# Patient Record
Sex: MALE | Race: WHITE | NOT HISPANIC OR LATINO
[De-identification: names, ages, dates, MRNs, and addresses within clinical notes are randomized per-mention and may not be internally consistent; named-entity substitution may affect disease eponyms.]

---

## 2022-04-28 ENCOUNTER — APPOINTMENT (OUTPATIENT)
Dept: ORTHOPEDIC SURGERY | Facility: CLINIC | Age: 27
End: 2022-04-28

## 2022-06-27 ENCOUNTER — APPOINTMENT (OUTPATIENT)
Dept: ORTHOPEDIC SURGERY | Facility: CLINIC | Age: 27
End: 2022-06-27
Payer: COMMERCIAL

## 2022-06-27 VITALS — HEIGHT: 71 IN | BODY MASS INDEX: 28.7 KG/M2 | WEIGHT: 205 LBS

## 2022-06-27 DIAGNOSIS — M54.16 RADICULOPATHY, LUMBAR REGION: ICD-10-CM

## 2022-06-27 DIAGNOSIS — M51.26 OTHER INTERVERTEBRAL DISC DISPLACEMENT, LUMBAR REGION: ICD-10-CM

## 2022-06-27 DIAGNOSIS — M54.50 LOW BACK PAIN, UNSPECIFIED: ICD-10-CM

## 2022-06-27 PROCEDURE — 99204 OFFICE O/P NEW MOD 45 MIN: CPT

## 2022-06-27 NOTE — DISCUSSION/SUMMARY
[de-identified] : Left-sided L4-5 radiculopathy.\par Significantly improved.\par Here for second opinion.\par Discussed all options.\par Light exercise with caution.\par No squatting, leg press, or dead lifting.\par Lumbar HEP.\par All options discussed including rest, medicine, home exercise, acupuncture, Chiropractic care, Physical Therapy, Pain management, and last resort surgery. All questions were answered, all alternatives discussed and the patient is in complete agreement with that plan. Follow-up appointment as instructed. Any issues and the patient will call or come in sooner.\par

## 2022-06-27 NOTE — ADDENDUM
[FreeTextEntry1] : This note was written by Yordy Rivas on 06/27/2022 acting as scribe for Dr. Blaine Gandara M.D.\par \par I, Blaine Gandara MD, have read and attest that all the information, medical decision making and discharge instructions within are true and accurate.

## 2022-06-27 NOTE — HISTORY OF PRESENT ILLNESS
[Improving] : improving [de-identified] : Patient is a 26 year-old male who presents to the office today for initial evaluation of lower back pain. The pain has been present for many years (>8). He was diagnosed with a herniated disc. He notes the pain improves over time. Recently, he had an episode where he pulled out his back this past April. Today, he is feeling well, but notes pain directly over the spine of the lower lumbar spine. He denies any current radicular symptoms, but recently has had left lower extremity radicular symptoms to the calf. He states that his symptoms in the leg were pain and not numbness. \par Has seen Dr. Porter Bonilla ([facility]), who sent him for an MRI.\par He has been doing PT which has been helping his symptoms. No acupuncture or Chiropractic care.\par He has been taking Advil which did give some relief.\par Cyclobenzaprine and Tramadol was somewhat helpful.\par MDP was taken in April which did not give much relief at all.\par No history of injections. \par No fever, chills, sweats, nausea or vomiting. No bowel or bladder dysfunction, no recent weight loss or gain, no night pain. This history is in addition to the intake form that I personally reviewed.\par \par

## 2022-06-27 NOTE — PHYSICAL EXAM
[Normal] : Gait: normal [Betancur's Sign] : negative Betancur's sign [Pronator Drift] : negative pronator drift [SLR] : negative straight leg raise [de-identified] : 5 out of 5 motor strength, sensation is intact and symmetrical full range of motion flexion extension and rotation, no palpatory tenderness full range of motion of hips knees shoulders and elbows (all four extremities), no atrophy, negative straight leg raise, no pathological reflexes, no swelling, normal ambulation, no apparent distress skin is intact, no palpable lymph nodes, no upper or lower extremity instability, alert and oriented x3 and normal mood. Normal finger-to nose test.\par  [de-identified] : MRI Lumbar Spine - 04/28/2022\par \par Impression:\par -Left central inferior disc extrusion slightly larger at the L4-5 level with marked left lateral disc stenosis and moderate thecal sac compression.\par -Mild subluxation at the L5-S1 level, worse from previous MRI\par -Left foraminal disc protrusion at the L3-4 level with marked left foraminal stenosis and mass effect on the descending left L3 nerve root not present previously.\par -Diffusely bulging disc, central disc protrusion (not previously present) and facet hypertrophy at the L5-S1 level resulting in moderate right foraminal stenosis.